# Patient Record
Sex: FEMALE | Race: ASIAN | NOT HISPANIC OR LATINO | URBAN - METROPOLITAN AREA
[De-identification: names, ages, dates, MRNs, and addresses within clinical notes are randomized per-mention and may not be internally consistent; named-entity substitution may affect disease eponyms.]

---

## 2022-07-25 ENCOUNTER — EMERGENCY (EMERGENCY)
Facility: HOSPITAL | Age: 72
LOS: 1 days | Discharge: HOME | End: 2022-07-25
Attending: EMERGENCY MEDICINE | Admitting: EMERGENCY MEDICINE

## 2022-07-25 VITALS
DIASTOLIC BLOOD PRESSURE: 89 MMHG | OXYGEN SATURATION: 100 % | HEART RATE: 70 BPM | RESPIRATION RATE: 16 BRPM | TEMPERATURE: 98 F | SYSTOLIC BLOOD PRESSURE: 152 MMHG

## 2022-07-25 PROCEDURE — 73120 X-RAY EXAM OF HAND: CPT | Mod: 26,RT

## 2022-07-25 PROCEDURE — 99285 EMERGENCY DEPT VISIT HI MDM: CPT

## 2022-07-25 NOTE — ED PROVIDER NOTE - OBJECTIVE STATEMENT
72 yo F PMHx of HLD, HTN, presenting to ED for right thumb pain and swelling w/ concern for infection. No injury. No animal bites. No systemic symptoms of fever, chills, or N/V. Failing Augmentin x 5 days - now has tracking of erythema up to mid anterior wrist. Pt denies injuries. Sent in by her PCP for concern for failing abx.

## 2022-07-25 NOTE — ED PROVIDER NOTE - CLINICAL SUMMARY MEDICAL DECISION MAKING FREE TEXT BOX
70 yo F PMHx of HLD, HTN, presenting to ED for right thumb pain and swelling w/ concern for infection. No injury. No animal bites. No systemic symptoms of fever, chills, or N/V. Failing Augmentin x 5 days - now has tracking of erythema up to mid anterior wrist. Pt denies injuries. Sent in by her PCP for concern for failing abx. Exam as above. Low concern for flexor compartment infection. Consider paronychia vs felon. Consider cellulitis. Consider osteo. XR, labs, will reassess. May require IV abx if no drainable fluid collection given ongoing symptoms on abx.

## 2022-07-25 NOTE — ED PROVIDER NOTE - PHYSICAL EXAMINATION
Gen: A&Ox4   HEENT: Atraumatic. Mucous membranes moist, no scleral icterus.  CV: RRR. No significant lower extremity edema.   Resp: Respirations unlabored. CTAB, no rales, no wheezes.  GI: Abdomen non tender to palpation, soft and non-distended.   Skin/MSK: R thumb with swelling distally and slight erythema, lymphangitic in appearance, tracking up anterior wrist. No pain w/ flexion of thumb against resistance or palpation of flexor compartment, sensation and cap refill intact distally. Area of minimal fluctuance just proximal to nail bed w/ small ulceration. Small wound at distal nail bed w/ elevation of nail bed.   Neuro: Following commands. EOMI. Pupils ERRL.  Psych: Appropriate mood, cooperative

## 2022-07-25 NOTE — ED PROVIDER NOTE - ATTENDING CONTRIBUTION TO CARE
DR. JOHN, ATTENDING MD-  I performed a face to face bedside interview with the patient regarding history of present illness, review of symptoms and past medical history. I completed an independent physical exam.  I have discussed the patient's plan of care with the resident.   Documentation as above in the note.    70 y/o female r hand dom with c/o r thumb infection x2 wks.  Her pcp rx augmentin 5 days ago but not improving.  Now with red streaking down forearm.  R thumb is swollen vs left, small ulcer, distally nv intact, mild erythema to prox forearm.  Failed o/p abx, cellulitis, possible underlying abscess vs osteomyelitis, no flexor tendon ttp or pain with flex/ext to suggest fts.  Obtain cbc bmp flu with covid xr thumb give iv abx discuss with hand for possible i&d.

## 2022-07-25 NOTE — ED ADULT TRIAGE NOTE - CHIEF COMPLAINT QUOTE
pt c/o right thumb pain and swelling x 2 weeks. was prescribed antibiotics by PMD, sent for possible I+D. pt denies injury to finger. pt denies fever/chills.

## 2022-07-25 NOTE — ED PROVIDER NOTE - NS ED ROS FT
Gen: Denies fever.   HEENT: Denies headache. Denies congestion.  CV: Denies chest pain. Denies lightheadedness.  Skin: Denies rash.   Resp: Denies SOB. Denies cough.  GI: Denies abd pain. Denies nausea. Denies vomiting. Denies diarrhea. Denies melena. Denies hematochezia.  Msk: Denies extremity swelling. Denies extremity pain.  : Denies dysuria. Denies hematuria.  Neuro: Denies LOC. Denies dizziness. Denies new numbness/tingling. Denies new focal weakness.  Psych: Denies SI Gen: Denies fever.   HEENT: Denies headache. Denies congestion.  CV: Denies chest pain. Denies lightheadedness.  Skin: Denies rash.   Resp: Denies SOB. Denies cough.  GI: Denies abd pain. Denies nausea. Denies vomiting. Denies diarrhea. Denies melena. Denies hematochezia.  Msk: +extremity swelling. +extremity pain.  : Denies dysuria. Denies hematuria.  Neuro: Denies LOC. Denies dizziness. Denies new numbness/tingling. Denies new focal weakness.  Psych: Denies SI

## 2022-07-25 NOTE — ED ADULT NURSE NOTE - OBJECTIVE STATEMENT
patient aaox4. ambulatory. here with daughter at bedside. primarily Azeri speaking. refused interpretation services. came in with right thumb pain from wound. currently open and red. not actively bleeding. no med hx or on meds. took pain meds at home but unsure what the name is. was seen by doctor who prescribed augmentin with no relief for a week. iv to left forearm #20g. labs drawn and sent. Will continue to monitor

## 2022-07-26 LAB
ALBUMIN SERPL ELPH-MCNC: 4.3 G/DL — SIGNIFICANT CHANGE UP (ref 3.3–5)
ALP SERPL-CCNC: 72 U/L — SIGNIFICANT CHANGE UP (ref 40–120)
ALT FLD-CCNC: 19 U/L — SIGNIFICANT CHANGE UP (ref 4–33)
ANION GAP SERPL CALC-SCNC: 10 MMOL/L — SIGNIFICANT CHANGE UP (ref 7–14)
AST SERPL-CCNC: 19 U/L — SIGNIFICANT CHANGE UP (ref 4–32)
BASOPHILS # BLD AUTO: 0.02 K/UL — SIGNIFICANT CHANGE UP (ref 0–0.2)
BASOPHILS NFR BLD AUTO: 0.2 % — SIGNIFICANT CHANGE UP (ref 0–2)
BILIRUB SERPL-MCNC: 0.4 MG/DL — SIGNIFICANT CHANGE UP (ref 0.2–1.2)
BUN SERPL-MCNC: 15 MG/DL — SIGNIFICANT CHANGE UP (ref 7–23)
CALCIUM SERPL-MCNC: 9.1 MG/DL — SIGNIFICANT CHANGE UP (ref 8.4–10.5)
CHLORIDE SERPL-SCNC: 105 MMOL/L — SIGNIFICANT CHANGE UP (ref 98–107)
CO2 SERPL-SCNC: 23 MMOL/L — SIGNIFICANT CHANGE UP (ref 22–31)
CREAT SERPL-MCNC: 0.94 MG/DL — SIGNIFICANT CHANGE UP (ref 0.5–1.3)
EGFR: 64 ML/MIN/1.73M2 — SIGNIFICANT CHANGE UP
EOSINOPHIL # BLD AUTO: 0.55 K/UL — HIGH (ref 0–0.5)
EOSINOPHIL NFR BLD AUTO: 6.8 % — HIGH (ref 0–6)
FLUAV AG NPH QL: SIGNIFICANT CHANGE UP
FLUBV AG NPH QL: SIGNIFICANT CHANGE UP
GLUCOSE SERPL-MCNC: 90 MG/DL — SIGNIFICANT CHANGE UP (ref 70–99)
HCT VFR BLD CALC: 34.1 % — LOW (ref 34.5–45)
HGB BLD-MCNC: 11.4 G/DL — LOW (ref 11.5–15.5)
IANC: 4.19 K/UL — SIGNIFICANT CHANGE UP (ref 1.8–7.4)
IMM GRANULOCYTES NFR BLD AUTO: 0.2 % — SIGNIFICANT CHANGE UP (ref 0–1.5)
LYMPHOCYTES # BLD AUTO: 2.55 K/UL — SIGNIFICANT CHANGE UP (ref 1–3.3)
LYMPHOCYTES # BLD AUTO: 31.8 % — SIGNIFICANT CHANGE UP (ref 13–44)
MCHC RBC-ENTMCNC: 28.6 PG — SIGNIFICANT CHANGE UP (ref 27–34)
MCHC RBC-ENTMCNC: 33.4 GM/DL — SIGNIFICANT CHANGE UP (ref 32–36)
MCV RBC AUTO: 85.5 FL — SIGNIFICANT CHANGE UP (ref 80–100)
MONOCYTES # BLD AUTO: 0.7 K/UL — SIGNIFICANT CHANGE UP (ref 0–0.9)
MONOCYTES NFR BLD AUTO: 8.7 % — SIGNIFICANT CHANGE UP (ref 2–14)
NEUTROPHILS # BLD AUTO: 4.19 K/UL — SIGNIFICANT CHANGE UP (ref 1.8–7.4)
NEUTROPHILS NFR BLD AUTO: 52.3 % — SIGNIFICANT CHANGE UP (ref 43–77)
NRBC # BLD: 0 /100 WBCS — SIGNIFICANT CHANGE UP
NRBC # FLD: 0 K/UL — SIGNIFICANT CHANGE UP
PLATELET # BLD AUTO: 240 K/UL — SIGNIFICANT CHANGE UP (ref 150–400)
POTASSIUM SERPL-MCNC: 4 MMOL/L — SIGNIFICANT CHANGE UP (ref 3.5–5.3)
POTASSIUM SERPL-SCNC: 4 MMOL/L — SIGNIFICANT CHANGE UP (ref 3.5–5.3)
PROT SERPL-MCNC: 7.2 G/DL — SIGNIFICANT CHANGE UP (ref 6–8.3)
RBC # BLD: 3.99 M/UL — SIGNIFICANT CHANGE UP (ref 3.8–5.2)
RBC # FLD: 14 % — SIGNIFICANT CHANGE UP (ref 10.3–14.5)
RSV RNA NPH QL NAA+NON-PROBE: SIGNIFICANT CHANGE UP
SARS-COV-2 RNA SPEC QL NAA+PROBE: SIGNIFICANT CHANGE UP
SODIUM SERPL-SCNC: 138 MMOL/L — SIGNIFICANT CHANGE UP (ref 135–145)
WBC # BLD: 8.03 K/UL — SIGNIFICANT CHANGE UP (ref 3.8–10.5)
WBC # FLD AUTO: 8.03 K/UL — SIGNIFICANT CHANGE UP (ref 3.8–10.5)

## 2022-07-26 PROCEDURE — 99218: CPT

## 2022-07-26 RX ORDER — LEVOTHYROXINE SODIUM 125 MCG
50 TABLET ORAL DAILY
Refills: 0 | Status: DISCONTINUED | OUTPATIENT
Start: 2022-07-26 | End: 2022-07-29

## 2022-07-26 RX ADMIN — Medication 100 MILLIGRAM(S): at 13:30

## 2022-07-26 RX ADMIN — Medication 50 MICROGRAM(S): at 05:01

## 2022-07-26 RX ADMIN — Medication 100 MILLIGRAM(S): at 22:06

## 2022-07-26 RX ADMIN — Medication 100 MILLIGRAM(S): at 05:01

## 2022-07-26 RX ADMIN — Medication 100 MILLIGRAM(S): at 02:11

## 2022-07-26 NOTE — ED CDU PROVIDER INITIAL DAY NOTE - MEDICAL DECISION MAKING DETAILS
72 yo F PMHx of hypothyroidism, presenting to ED for right thumb pain and swelling w/ concern for infection. states that its been swollen for almost 2 weeks, + was placed on augmentin x 5 days with  no relief. Denies any  injuries to the finger or any animal bites. denies any HA, fever, chills, cough,  N/V, chest pain,s ob, abdominal pain, urinary symptoms, numbness/weakness/tingling, recent travel, sick contact, social history   Sent in by her PCP for concern for failing abx.  sent to cdu for iv abx, hand called will see in the AM

## 2022-07-26 NOTE — ED CDU PROVIDER INITIAL DAY NOTE - OBJECTIVE STATEMENT
70 yo F PMHx of hypothyroidism, presenting to ED for right thumb pain and swelling w/ concern for infection. states that its been swollen for almost 2 weeks, + was placed on augmentin x 5 days with  no relief. Denies any  injuries to the finger or any animal bites. denies any HA, fever, chills, cough,  N/V, chest pain,s ob, abdominal pain, urinary symptoms, numbness/weakness/tingling, recent travel, sick contact, social history   Sent in by her PCP for concern for failing abx.  sent to cdu for iv abx, hand called will see in the AM

## 2022-07-26 NOTE — ED CDU PROVIDER INITIAL DAY NOTE - ATTENDING APP SHARED VISIT CONTRIBUTION OF CARE
CDU MD JOHN:  I performed a face to face bedside interview with patient regarding history of present illness, review of symptoms and past medical history. I completed an independent physical exam.  I have discussed patient's plan of care with PA.   I agree with note as stated above, having amended the EMR as needed to reflect my findings. I have discussed the assessment and plan of care.  This includes during the time I functioned as the attending physician for this patient.    70 y/o female with r thumb infection.  CDU for iv abx, hand to see in am.

## 2022-07-26 NOTE — PROGRESS NOTE ADULT - SUBJECTIVE AND OBJECTIVE BOX
Pt seen  Chart reviewed  Full consult dictation to follow    Right thumb pain and swelling  Hx & PE c/w paronychia - improving and draining per pt  Disc'd options - cont conservative tx vs I&D, and pt declines I&D at this time  Cont abx, warm soaks, bacitracin dressing  reconsult PRN, ok to f/u in office post d/c

## 2022-07-26 NOTE — ED CDU PROVIDER INITIAL DAY NOTE - PROGRESS NOTE DETAILS
DIMITRI ROPER: pt reassessed, no distress, pain controlled.  interpretor ID# [id number] for Romanian interpretation  61F with PMH of hypothyroidism presenting with R thumb infection, treated with augmentin for 5 days prior with persistent symptoms. no fevers. no recent trauma. feeling well at this time. updated on plan to stay for 24 hours IV abx as per hand surgery DR. Peterson recommendations. DIMITRI ROPER: pt reassessed, no distress, pain controlled.  interpretor ID# [id number] for Kazakh interpretation  61F with PMH of hypothyroidism presenting with R thumb infection, treated with augmentin for 5 days prior with persistent symptoms. no fevers. no recent trauma. feeling well at this time. updated on plan to stay for 24 hours IV abx as per hand surgery DR. Peterson recommendations.    persistent paronychia noted to R thumb,+fluctuance, mild small ulceration noted just proximally to nail bed, with tracking erythema/edema spreading proximally to  ROM WNL. sensation intact. no active drainage.   VSS, afebrile.

## 2022-07-26 NOTE — ED CDU PROVIDER INITIAL DAY NOTE - PHYSICAL EXAMINATION
Gen: A&Ox4   HEENT: Atraumatic. Mucous membranes moist, no scleral icterus.  CV: RRR. No significant lower extremity edema.   Resp: Respirations unlabored. CTAB, no rales, no wheezes.  GI: Abdomen non tender to palpation, soft and non-distended.   Skin/MSK: R thumb with swelling distally and slight erythema, lymphangitic in appearance, tracking up anterior wrist. No pain w/ flexion of thumb against resistance or palpation of flexor compartment, sensation and cap refill intact distally. Area of minimal fluctuance just proximal to nail bed w/ small ulceration. Small wound at distal nail bed w/ elevation of nail bed.   Neuro: Following commands. EOMI. Pupils ERRL.  Psych: Appropriate mood, cooperative  - juan alberto fraser

## 2022-07-26 NOTE — ED CDU PROVIDER INITIAL DAY NOTE - NS ED ATTENDING STATEMENT MOD
This was a shared visit with the ARIAS. I reviewed and verified the documentation and independently performed the documented:

## 2022-07-27 VITALS
RESPIRATION RATE: 17 BRPM | HEART RATE: 68 BPM | TEMPERATURE: 98 F | SYSTOLIC BLOOD PRESSURE: 119 MMHG | OXYGEN SATURATION: 97 % | DIASTOLIC BLOOD PRESSURE: 71 MMHG

## 2022-07-27 PROCEDURE — 99217: CPT

## 2022-07-27 RX ORDER — IBUPROFEN 200 MG
600 TABLET ORAL ONCE
Refills: 0 | Status: COMPLETED | OUTPATIENT
Start: 2022-07-27 | End: 2022-07-27

## 2022-07-27 RX ADMIN — Medication 600 MILLIGRAM(S): at 12:23

## 2022-07-27 RX ADMIN — Medication 100 MILLIGRAM(S): at 05:30

## 2022-07-27 RX ADMIN — Medication 50 MICROGRAM(S): at 05:31

## 2022-07-27 NOTE — ED CDU PROVIDER DISPOSITION NOTE - PATIENT PORTAL LINK FT
You can access the FollowMyHealth Patient Portal offered by [facility] by registering at the following website: [email or web address]. By joining [employer]’s FollowMyHealth portal, you will also be able to view your health information using other applications (apps) compatible with our system.

## 2022-07-27 NOTE — ED CDU PROVIDER DISPOSITION NOTE - NSFOLLOWUPINSTRUCTIONS_ED_ALL_ED_FT
Follow up with Dr Ortiz on Friday 7/29/22 - call and make your appointment. Rest and elevate affected area. Soak right thumb in warm water and salt 3 times a day for at least 15 minutes. Take Clindamycin 300mg every six hours for seven days. Take Motrin 600mg every 8 hours with food for pain. Any worsening redness, swelling, streaking (red lines), fever, chills return to ER

## 2022-07-27 NOTE — ED CDU PROVIDER DISPOSITION NOTE - ATTENDING CONTRIBUTION TO CARE
apteient had drainage, but still some swelling, FROM, sensation intact- to f/u with plastics in 2 days and continue antibiotics. Agree with d/c

## 2022-07-27 NOTE — ED CDU PROVIDER SUBSEQUENT DAY NOTE - ATTENDING APP SHARED VISIT CONTRIBUTION OF CARE
Patient examined, left thumb with paronychia with erythema spreading to DIP joint area, sensation and cap refill intact. pain on ROM thumb- recomment I and D Patient examined ,right thumb with paronychia with erythema spreading to DIP joint area, sensation and cap refill intact. pain on ROM thumb- recommend I and D

## 2022-07-27 NOTE — ED CDU PROVIDER DISPOSITION NOTE - CARE PROVIDER_API CALL
Williams Ortiz (DO)  Plastic Surgery  [address] [place] [address]  Phone: [phone]  Fax: [phone]  Follow Up Time:

## 2022-07-27 NOTE — ED CDU PROVIDER DISPOSITION NOTE - CLINICAL COURSE
71 y.o female with a PMhx of hypothyroidism, presented to the ED for concerns of infection, rt thumb has been swollen for 2 weeks, was on augmentin no improvement, she denies having any trauma or falls. Pt evaluated in ED, Dr Gupta (hand evaluated pt) - pt declined I and D yesterday. Today pt states swelling improved but still very painful and is open to I and D at this point - discussed with Dr Gupta. ED team I&D'd rt 1st digit at bedside with mild purulent expression, pt tolerated procedure well and will f/u with Dr Gupta in 2 days in office.

## 2023-07-02 ENCOUNTER — EMERGENCY (EMERGENCY)
Facility: HOSPITAL | Age: 73
LOS: 1 days | Discharge: HOME | End: 2023-07-02
Attending: EMERGENCY MEDICINE | Admitting: EMERGENCY MEDICINE
Payer: MEDICAID

## 2023-07-02 VITALS
DIASTOLIC BLOOD PRESSURE: 74 MMHG | OXYGEN SATURATION: 100 % | RESPIRATION RATE: 16 BRPM | TEMPERATURE: 98 F | HEART RATE: 75 BPM | SYSTOLIC BLOOD PRESSURE: 130 MMHG

## 2023-07-02 VITALS
RESPIRATION RATE: 16 BRPM | SYSTOLIC BLOOD PRESSURE: 136 MMHG | HEART RATE: 61 BPM | DIASTOLIC BLOOD PRESSURE: 84 MMHG | OXYGEN SATURATION: 100 %

## 2023-07-02 PROCEDURE — 73630 X-RAY EXAM OF FOOT: CPT | Mod: 26,LT

## 2023-07-02 PROCEDURE — 73610 X-RAY EXAM OF ANKLE: CPT | Mod: 26,LT

## 2023-07-02 PROCEDURE — 99284 EMERGENCY DEPT VISIT MOD MDM: CPT

## 2023-07-02 RX ORDER — IBUPROFEN 200 MG
400 TABLET ORAL ONCE
Refills: 0 | Status: COMPLETED | OUTPATIENT
Start: 2023-07-02 | End: 2023-07-02

## 2023-07-02 RX ADMIN — Medication 400 MILLIGRAM(S): at 10:45

## 2023-07-02 NOTE — ED PROVIDER NOTE - PROGRESS NOTE DETAILS
X-ray results interpreted by me concerning for fifth metatarsal fracture.  Podiatry consulted, they are currently at [facility] seeing another patient but will come as soon as they are finished there.  Patient informed and is agreeable. Reassessed again-spoke with podiatry again-they are still at other hospital dealing with emergent case. They will try to find someone else to come by otherwise they will come as soon as they are done. Patient and daughter informed of the wait and they are agreeable to waiting. Patient resting with her leg elevated. MD CHO:  I received s/o on this pt from Dr. Rojas.  Plan:  awaiting podiatry care.  I spoke with podiatry, eta approx 30 min.  They will place pt in posterior splint, advise f/u in 1 wk with Dr. Waterhouse, [phone].

## 2023-07-02 NOTE — ED PROVIDER NOTE - CARE PROVIDER_API CALL
Waterhouse, Joseph Cameron  Podiatric Medicine and Surgery  [address] [place] [address]  Phone: [phone]  Fax: [phone]  Follow Up Time:

## 2023-07-02 NOTE — ED PROVIDER NOTE - PATIENT PORTAL LINK FT
You can access the FollowMyHealth Patient Portal offered by [facility] by registering at the following website: [email or web address]. By joining [employer]’s FollowMyHealth portal, you will also be able to view your health information using other applications (apps) compatible with our system.

## 2023-07-02 NOTE — ED PROVIDER NOTE - OBJECTIVE STATEMENT
Patient requesting daughter at bedside to translate for her when offered translational services.  72-year-old female presenting to the emergency department with left foot pain status post fall.  Fall occurred yesterday, missed a step on a little landing stair, fell to the ground, landed on her left side, no head trauma or LOC, was able to immediately get up and walk.  Reports that since then she has had worsening pain to the left lateral foot region which worsens on ambulation.  Also noted swelling and bruising to the area.  Does not have any hip pain or upper leg pain, no back pain, no recent fevers chills cough congestion chest pain shortness of breath abdominal pain vomiting diarrhea or dysuria.No blood thinner use.

## 2023-07-02 NOTE — ED ADULT NURSE REASSESSMENT NOTE - NS ED NURSE REASSESS COMMENT FT1
c/o left ankle pain,  some swelling noted. no obvious deformity or bleeding,  pt with dry non productive cough  reports for 2 weeks and saw PMD for it.

## 2023-07-02 NOTE — ED PROVIDER NOTE - ATTENDING APP SHARED VISIT CONTRIBUTION OF CARE
h/p by me, care s/o at shift change pending podiatry eval, patient aware of podiatry delays,ok to wait, foot is elevated on bed.

## 2023-07-02 NOTE — ED PROVIDER NOTE - CLINICAL SUMMARY MEDICAL DECISION MAKING FREE TEXT BOX
72-year-old female presenting to the emergency department with left foot pain status post fall yesterday, no head trauma no LOC no other injuries.  Foot with swelling and tenderness noted to the dorsal fourth and fifth metatarsal regions.Foot is well-perfused with nerve and tendon functions intact.  Plan for x-rays, pain control, already took Tylenol prior to coming so we will add ibuprofen which she has tolerated well in the past per her report, will determine type of splint pending x-ray result.

## 2023-07-02 NOTE — DISCHARGE NOTE PROVIDER - NSDCFUADDAPPT_GEN_ALL_CORE_FT
Podiatry Discharge Instructions:  Follow up: Please follow up with Dr. Waterhouse within 1 week of discharge from the hospital, please call [phone] for appointment and discuss that you recently were seen in the hospital.  Wound Care: Please keep posterior splint and dressings clean, dry and intact until in office visit.  Weight bearing: Please weight bear as tolerated to the heel using a cane.  Antibiotics: Please continue as instructed.

## 2023-07-02 NOTE — ED PROVIDER NOTE - NSFOLLOWUPINSTRUCTIONS_ED_ALL_ED_FT
You were seen in the emergency department for foot pain after a fall.  You had x-rays performed which are included in this packet and you were given ibuprofen for pain.  Please take ibuprofen 400mg every 6 hours as needed for pain with food.  Please take tylenol 650mg every 4 hours as needed for pain.  Please follow-up with podiatry or orthopedics in the next few days for reassessment.  Please get immediately reevaluated if you develop worsening pain, numbness, tingling, significant color change to your foot, or any new or worsening concern. You were seen in the emergency department for foot pain after a fall.  You had x-rays performed which are included in this packet and you were given ibuprofen for pain. Podiatry was consulted to see you for concern of a fifth metatarsal fracture.   Please take ibuprofen 400mg every 6 hours as needed for pain with food.  Please take tylenol 650mg every 4 hours as needed for pain.  Please follow-up with podiatry or orthopedics in the next few days for reassessment.  Please get immediately reevaluated if you develop worsening pain, numbness, tingling, significant color change to your foot, or any new or worsening concern.

## 2023-07-02 NOTE — ED PROVIDER NOTE - PHYSICAL EXAMINATION
GEN - NAD; well appearing; A+O x3   HEAD - NC/AT   EYES- PERRL, EOMI  ENT: Airway patent, mmm, Oral cavity and pharynx normal. No inflammation, swelling, exudate, or lesions.    NECK: Neck supple, from, no midline spinal ttp, no deformity/stepoff  PULMONARY - CTA b/l, symmetric breath sounds.   CARDIAC -s1s2, RRR, no M,G,R  ABDOMEN - +BS, ND, NT, soft, no guarding, no rebound, no masses   BACK - no CVA tenderness, Normal  spine, no midline ttp, from, no stepoff/deformity   EXTREMITIES - LLE foot with ttp and swelling to dorsal 4th/5th metatarsal region, no ttp to ankle, no malleolar ttp, no ankle swelling, from to ankle joints and to all toes, toes appear normal without any deformity, 2+ dp pulse present, brisk cap refill present, SILT, remainder of ext exam with FROM,  no deformity, no edema, no bruising, hips/pelvis stable wihtout any ttp,  SKIN - no rash or bruising   NEUROLOGIC - alert, speech clear, face symmetric 5/5 strength b/l ue and le, silt

## 2023-07-03 NOTE — CONSULT NOTE ADULT - SUBJECTIVE AND OBJECTIVE BOX
Patient is a 72y old  Female who presents with a chief complaint of left foot pain    HPI:  ·Patient requesting daughter at bedside to translate for her when offered translational services.  72-year-old female presenting to the emergency department with left foot pain status post fall.  Fall occurred yesterday, missed a step on a little landing stair, fell to the ground, landed on her left side, no head trauma or LOC, was able to immediately get up and walk.  Reports that since then she has had worsening pain to the left lateral foot region which worsens on ambulation.  Also noted swelling and bruising to the area.  Does not have any hip pain or upper leg pain, no back pain, no recent fevers chills cough congestion chest pain shortness of breath abdominal pain vomiting diarrhea or dysuria.No blood thinner use.      PAST MEDICAL & SURGICAL HISTORY:  Thyroid disorder      No significant past surgical history          MEDICATIONS  (STANDING):    MEDICATIONS  (PRN):      Allergies    No Known Allergies    Intolerances        VITALS:    Vital Signs Last 24 Hrs  T(C): 36.6 (02 Jul 2023 10:14), Max: 36.6 (02 Jul 2023 10:14)  T(F): 97.9 (02 Jul 2023 10:14), Max: 97.9 (02 Jul 2023 10:14)  HR: 61 (02 Jul 2023 14:01) (61 - 75)  BP: 136/84 (02 Jul 2023 14:01) (130/74 - 136/84)  BP(mean): --  RR: 16 (02 Jul 2023 14:01) (16 - 16)  SpO2: 100% (02 Jul 2023 14:01) (100% - 100%)    Parameters below as of 02 Jul 2023 14:01  Patient On (Oxygen Delivery Method): room air        LABS:                CAPILLARY BLOOD GLUCOSE              LOWER EXTREMITY PHYSICAL EXAM:    Vascular: DP/PT 2/4, B/L, CFT <3 seconds B/L, Temperature gradient warm to cool, B/L. Edema to the left foot  Neuro: Epicritic sensation intact  Musculoskeletal/Ortho: left foot lateral pain on palpation  Skin: b/l no ecchymosis, no open wounds or lesions, no clinical signs of infection.     RADIOLOGY & ADDITIONAL STUDIES:  < from: Xray Foot AP + Lateral + Oblique, Left (07.02.23 @ 11:02) >  TECHNIQUE: 3 views of the left foot and ankle.    COMPARISON: No similar prior comparisons available.    FINDINGS:  Acute transverse nondisplaced fracture of the base of the fifth   metatarsal.  No intra-articular fracture involvement.  The joint spaces are preserved.  Plantar calcaneal enthesophyte.    IMPRESSION:  Acute fracture of the fifth metatarsal base.    < from: Xray Ankle Complete 3 Views, Left (07.02.23 @ 11:02) >  TECHNIQUE: 3 views of the left foot and ankle.    COMPARISON: No similar prior comparisons available.    FINDINGS:  Acute transverse nondisplaced fracture of the base of the fifth   metatarsal.  No intra-articular fracture involvement.  The joint spaces are preserved.  Plantar calcaneal enthesophyte.    IMPRESSION:  Acute fracture of the fifth metatarsal base.

## 2023-07-03 NOTE — CONSULT NOTE ADULT - ASSESSMENT
73yo F with left foot pain  - Pt seen and evaluated.  - Afebrile, no labs taken.  - B/l no ecchymosis, no open wounds or lesions, no clinical signs of infection.   - Left foot XR: Acute transverse nondisplaced fracture of the base of the fifth metatarsal. No intra-articular involvement.  - Discharge paperwork completed.  - Pod plan: Posterior splint applied, follow up with Dr. Waterhouse within 1 week of discharge ([phone]).  - Discussed with attending.
